# Patient Record
Sex: FEMALE | Race: BLACK OR AFRICAN AMERICAN | Employment: UNEMPLOYED
[De-identification: names, ages, dates, MRNs, and addresses within clinical notes are randomized per-mention and may not be internally consistent; named-entity substitution may affect disease eponyms.]

---

## 2023-09-18 ENCOUNTER — HOSPITAL ENCOUNTER (OUTPATIENT)
Facility: HOSPITAL | Age: 63
Setting detail: RECURRING SERIES
Discharge: HOME | End: 2023-09-21
Payer: MEDICARE

## 2023-09-18 PROCEDURE — 97162 PT EVAL MOD COMPLEX 30 MIN: CPT | Performed by: PHYSICAL THERAPIST

## 2023-09-18 PROCEDURE — 97110 THERAPEUTIC EXERCISES: CPT | Performed by: PHYSICAL THERAPIST

## 2023-09-18 NOTE — THERAPY EVALUATION
PT DAILY TREATMENT NOTE/EVAL     Patient Name: Patricia Dennison    Date: 2023    : 1960  Insurance: Payor: [facility] MEDICARE / Plan: [address] / Product Type: *No Product type* /      Patient  verified yes     Visit #   Current / Total 1 10   Time   In / Out 1:40 2:20   Pain   In / Out 0 0   Subjective Functional Status/Changes: Patient has multiple health issues. She is on oxygen. States she is fairly sedentary. Treatment Area: Muscle weakness (generalized) [M62.81]    SUBJECTIVE  Pain Level (0-10 scale): 0/10  []constant []intermittent []improving []worsening []no change since onset    Any medication changes, allergies to medications, adverse drug reactions, diagnosis change, or new procedure performed?: [x] No    [] Yes (see summary sheet for update)  Subjective functional status/changes:     PLOF: Was not on oxygen, was living with another sister but she had stairs. Was able to do stairs. Able to do household chores. Limitations to PLOF: She is avoiding stairs, does a little walking now for about 5 minutes. Mechanism of Injury: She has been having problems since 2023. States she was told she had CHF and noted \"they kevin fluid off me\". Was has hospitalized for about 15 days then transferred to a nursing home for about a month. She has been staying with her sister since she got out of the nursing home. Current symptoms/Complaints: Patient states her CC is limited strength in her legs. She fell the other day. She has fallen only 1 time on the sidewalk. States her legs gave way. She is using a SPC for ambulation. She is able to do a little bit of cooking. Previous Treatment/Compliance: She did some rehab in the nursing home. She also had some home PT until about 2 months ago. PMHx/Surgical Hx: Diabetes, HTN, CHF, left TKA  Work Hx: Disabled. Living Situation: Lives with her sister. Has no steps into the house and her bed is on the first floor.   Pt

## 2023-09-18 NOTE — THERAPY EVALUATION
[facility]  [address] Ph: [phone] Fx: [phone]  Plan of Care / Statement of Necessity for Physical Therapy Services     Patient Name: Juan Chapa : 1960   Medical   Diagnosis: Muscle weakness (generalized) [M62.81] Treatment Diagnosis:   R26.89  Abnormalities of gait and mobility and M62.81  GENERAL MUSCLE WEAKNESS    Onset Date: 2023     Referral Source: Valente Dunlap, * Start of Care [facility]): 2023   Prior Hospitalization: See medical history Provider #: [id number]   Prior Level of Function:  Was not on oxygen, was living with another sister but she had stairs. Was able to do stairs. Able to do household chores. Comorbidities: BMI 46.7, COPD/Emphysema, CHF/heart disease, Depression, HBP. Assessment / key information:  Patient with signs and symptoms consistent with generalized deconditioning. Patient was hospitalized in April, followed by a time in a nursing home where she did receive rehab services. She was then discharged home and had home PT per patient. This ended two months ago and she has lost some of her LE strength. She did report one fall recently. She presents to PT ambulating with a SPC and carrying her oxygen tank. Her gait is with decreased renetta. She demonstrates a mild loss of strength but most notably her strength deficits are more proximal.  Functionally, she is fairly limited and note she is walking for only about 5 minutes at a time when she gets out to walk. Patient will benefit from a program of skilled physical therapy to include therapeutic exercises to address strength deficits, therapeutic activities to improve functional mobility, neuromuscular reeducation to address balance, coordination and proprioception, manual therapy to address ROM and tissue extensibility and modalities as indicated. All questions were answered.       Evaluation Complexity:  History:

## 2023-09-26 ENCOUNTER — APPOINTMENT (OUTPATIENT)
Facility: HOSPITAL | Age: 63
End: 2023-09-26
Payer: MEDICARE

## 2023-10-03 ENCOUNTER — HOSPITAL ENCOUNTER (OUTPATIENT)
Facility: HOSPITAL | Age: 63
Setting detail: RECURRING SERIES
Discharge: HOME | End: 2023-10-06
Payer: MEDICARE

## 2023-10-03 PROCEDURE — 97530 THERAPEUTIC ACTIVITIES: CPT

## 2023-10-03 PROCEDURE — 97110 THERAPEUTIC EXERCISES: CPT

## 2023-10-03 NOTE — PROGRESS NOTES
PHYSICAL / OCCUPATIONAL THERAPY - DAILY TREATMENT NOTE (updated )    Patient Name: Kemi Trujillo    Date: 10/3/2023    : 1960  Insurance: Payor: [facility] MEDICARE / Plan: Amie Horna / Product Type: *No Product type* /      Patient  verified Yes     Visit #   Current / Total 2 10   Time   In / Out 216 250   Pain   In / Out 0 0   Subjective Functional Status/Changes: Pt reports she has started her HEP without any issues. TREATMENT AREA =  Muscle weakness (generalized) [M62.81]    OBJECTIVE      Therapeutic Procedures:    33566 Therapeutic Exercise (timed):  increase ROM, strength, coordination, balance, and proprioception to improve patient's ability to progress to PLOF and address remaining functional goals. Tx Min Billable or 1:1 Min   (if diff from Tx Min) Details:   16  See flow sheet as applicable     98053 Therapeutic Activity (timed):  use of dynamic activities replicating functional movements to increase ROM, strength, coordination, balance, and proprioception in order to improve patient's ability to progress to PLOF and address remaining functional goals. Tx Min Billable or 1:1 Min   (if diff from Tx Min) Details:   18  See flow sheet as applicable       29  [facility] Totals Reminder: bill using total billable min of TIMED therapeutic procedures (example: do not include dry needle or estim unattended, both untimed codes, in totals to left)  8-22 min = 1 unit; 23-37 min = 2 units; 38-52 min = 3 units; 53-67 min = 4 units; 68-82 min = 5 units   Total Total     [x]  Patient Education billed concurrently with other procedures   [x] Review HEP    [] Progressed/Changed HEP, detail:    [] Other detail:       Objective Information/Functional Measures/Assessment    When pt's oxygen was checked before starting exercises she was 79 and took 3 minutes to return to 92. After each exercise pt's O2 was in the low 80s and would take 1-4 minutes to return to 92.  Pt is on 3L and no kinks were

## 2023-10-10 ENCOUNTER — TELEPHONE (OUTPATIENT)
Facility: HOSPITAL | Age: 63
End: 2023-10-10

## 2023-10-10 ENCOUNTER — HOSPITAL ENCOUNTER (OUTPATIENT)
Facility: HOSPITAL | Age: 63
Setting detail: RECURRING SERIES
End: 2023-10-10
Payer: MEDICARE

## 2023-10-10 NOTE — TELEPHONE ENCOUNTER
As per Yuridia Rodriguez. LVM for pt to inform her that she needs to have clearance from her doctor as to what exercies she can and cannot do. Also informed her that I was CXing her appt. for this afternoon and will wait to hear back from her regarding said clearance.

## 2023-10-10 NOTE — TELEPHONE ENCOUNTER
Pt called to verify if it was alright for her to participate in PT due to being diagnosed with a heart blockage maria g this month after an ER visit. She has an appt this afternoon and wanted clarification please.

## 2023-10-16 ENCOUNTER — TELEPHONE (OUTPATIENT)
Facility: HOSPITAL | Age: 63
End: 2023-10-16

## 2023-10-16 NOTE — TELEPHONE ENCOUNTER
Patient Cancel > 24 HRS Pt stated that she was cancelling this appt as she was having a procedure done.

## 2023-10-17 ENCOUNTER — APPOINTMENT (OUTPATIENT)
Facility: HOSPITAL | Age: 63
End: 2023-10-17
Payer: MEDICARE

## 2023-11-14 ENCOUNTER — TELEPHONE (OUTPATIENT)
Facility: HOSPITAL | Age: 63
End: 2023-11-14

## 2023-11-14 NOTE — TELEPHONE ENCOUNTER
Called patient to inform of discharge due to our 30 day policy. Per the individual that answered the call, patient passed yesterday.